# Patient Record
Sex: MALE | Race: WHITE | HISPANIC OR LATINO
[De-identification: names, ages, dates, MRNs, and addresses within clinical notes are randomized per-mention and may not be internally consistent; named-entity substitution may affect disease eponyms.]

---

## 2022-12-29 PROBLEM — Z00.00 ENCOUNTER FOR PREVENTIVE HEALTH EXAMINATION: Status: ACTIVE | Noted: 2022-12-29

## 2023-02-06 ENCOUNTER — APPOINTMENT (OUTPATIENT)
Dept: UROLOGY | Facility: CLINIC | Age: 58
End: 2023-02-06
Payer: COMMERCIAL

## 2023-02-06 ENCOUNTER — TRANSCRIPTION ENCOUNTER (OUTPATIENT)
Age: 58
End: 2023-02-06

## 2023-02-06 VITALS
RESPIRATION RATE: 16 BRPM | DIASTOLIC BLOOD PRESSURE: 69 MMHG | HEART RATE: 65 BPM | WEIGHT: 210 LBS | HEIGHT: 70 IN | BODY MASS INDEX: 30.06 KG/M2 | SYSTOLIC BLOOD PRESSURE: 195 MMHG

## 2023-02-06 DIAGNOSIS — R97.20 ELEVATED PROSTATE, SPECIFIC ANTIGEN [PSA]: ICD-10-CM

## 2023-02-06 DIAGNOSIS — Z78.9 OTHER SPECIFIED HEALTH STATUS: ICD-10-CM

## 2023-02-06 DIAGNOSIS — Z80.42 FAMILY HISTORY OF MALIGNANT NEOPLASM OF PROSTATE: ICD-10-CM

## 2023-02-06 PROCEDURE — 99203 OFFICE O/P NEW LOW 30 MIN: CPT

## 2023-02-07 PROBLEM — Z80.42 FAMILY HISTORY OF MALIGNANT NEOPLASM OF PROSTATE: Status: ACTIVE | Noted: 2023-02-07

## 2023-02-07 PROBLEM — Z78.9 NON-SMOKER: Status: ACTIVE | Noted: 2023-02-07

## 2023-02-07 PROBLEM — R97.20 PSA ELEVATION: Status: ACTIVE | Noted: 2023-02-06

## 2023-02-07 LAB
PSA FREE FLD-MCNC: 37 %
PSA FREE SERPL-MCNC: 0.38 NG/ML
PSA SERPL-MCNC: 1.03 NG/ML

## 2023-02-07 RX ORDER — LOSARTAN POTASSIUM 100 MG/1
100 TABLET, FILM COATED ORAL
Qty: 90 | Refills: 0 | Status: ACTIVE | COMMUNITY
Start: 2022-09-13

## 2023-02-07 RX ORDER — AMLODIPINE BESYLATE 5 MG/1
5 TABLET ORAL
Qty: 90 | Refills: 0 | Status: ACTIVE | COMMUNITY
Start: 2022-08-16

## 2023-02-07 RX ORDER — ERGOCALCIFEROL 1.25 MG/1
1.25 MG CAPSULE, LIQUID FILLED ORAL
Qty: 12 | Refills: 0 | Status: ACTIVE | COMMUNITY
Start: 2022-08-24

## 2023-02-07 RX ORDER — ATORVASTATIN CALCIUM 20 MG/1
20 TABLET, FILM COATED ORAL
Qty: 90 | Refills: 0 | Status: ACTIVE | COMMUNITY
Start: 2022-12-20

## 2023-02-07 NOTE — LETTER BODY
[Dear  ___] : Dear  [unfilled], [Consult Letter:] : I had the pleasure of evaluating your patient, [unfilled]. [Please see my note below.] : Please see my note below. [FreeTextEntry2] : Omid Gregorio MD\par [address] [facility]\[place] [address] [FreeTextEntry3] : Sincerely,\par \par \par \par \par Blake Queen MD, FACS\par Chief of Urology, [facility]\par  of Urology\par Director of Robotic and Laparoscopic Surgery\par [facility]\par \par [facility]\par [address] [place] [address]\[place] [address]\par P: [phone]\par F: [phone]\par [facility]

## 2023-02-07 NOTE — ASSESSMENT
[FreeTextEntry1] : We discussed the fact that PSA is a nonspecific test for cancer although it is prostate specific.  We have reviewed the fact that elevations can be caused by multiple separate processes with the prostate including prostate cancer, benign prostate enlargement, prostate inflammation or infection, or combination of any of the above.  We also reviewed that procedures involving catheterizations or manipulation of the prostate including colonoscopy could cause PSA to be elevated.  I also have reviewed with the patient that there is age specificity related to PSA and that over time there is some expectation that the PSA will slowly rise over time.\par \par The PSA level was repeated today.  It is back down to his baseline which today was 1.03 ng/mL.  The reason for the transient increase may have been some type of inflammation temporarily affecting the prostate or may have been a laboratory error, but given this reassuring level today, I do not think he will need to have additional work-up at this time.  We had briefly discussed the potential for prostate MRI, but I do not think it is necessary here.\par \par Annual prostate cancer screening was recommended to continue.  He can follow-up with me on an as-needed basis.\par \par Sincerely,\par \par \par \par \par Blake Queen MD, FACS\par Chief of Urology, [facility]\par  of Urology\par Director of Robotic and Laparoscopic Surgery\par [facility]\par \par [facility]\par [address] [place] [address], [facility]\[place] [address]\par P: [phone]\par F: [phone]\par [employer] \par

## 2023-02-07 NOTE — HISTORY OF PRESENT ILLNESS
[FreeTextEntry1] : Mr. Candelario is a 56yo M who is presenting for evaluation of elevated PSA. The patient has had long standing PSA's with his PCP which have usually been ~1ng/ml. The patient is presenting for evaluation today because his most recent PSA was found to be 4.59ng/ml. At the time of the PSA testing the patient had no recent instrumentation that could have causes a transient increase in PSA including cystoscopy or colonoscopy. He does not endorse any recent UTI's although did state that he had some viral illness around the time of his PSA testing. The patient has no family history of prostate cancer. \par \par At baseline patient reports minimal LUTS and denies frequency, urgency, incomplete emptying, and straining. He does endorse some mild nocturia.